# Patient Record
Sex: FEMALE | Race: WHITE | NOT HISPANIC OR LATINO
[De-identification: names, ages, dates, MRNs, and addresses within clinical notes are randomized per-mention and may not be internally consistent; named-entity substitution may affect disease eponyms.]

---

## 2021-09-27 PROBLEM — Z00.00 ENCOUNTER FOR PREVENTIVE HEALTH EXAMINATION: Status: ACTIVE | Noted: 2021-09-27

## 2021-09-28 ENCOUNTER — APPOINTMENT (OUTPATIENT)
Dept: CARDIOLOGY | Facility: CLINIC | Age: 56
End: 2021-09-28
Payer: COMMERCIAL

## 2021-09-28 VITALS
RESPIRATION RATE: 14 BRPM | HEIGHT: 61 IN | WEIGHT: 137 LBS | DIASTOLIC BLOOD PRESSURE: 68 MMHG | HEART RATE: 68 BPM | SYSTOLIC BLOOD PRESSURE: 114 MMHG | BODY MASS INDEX: 25.86 KG/M2

## 2021-09-28 DIAGNOSIS — Z78.9 OTHER SPECIFIED HEALTH STATUS: ICD-10-CM

## 2021-09-28 DIAGNOSIS — Z82.49 FAMILY HISTORY OF ISCHEMIC HEART DISEASE AND OTHER DISEASES OF THE CIRCULATORY SYSTEM: ICD-10-CM

## 2021-09-28 DIAGNOSIS — R53.83 OTHER FATIGUE: ICD-10-CM

## 2021-09-28 PROCEDURE — 99244 OFF/OP CNSLTJ NEW/EST MOD 40: CPT

## 2021-09-28 PROCEDURE — 93000 ELECTROCARDIOGRAM COMPLETE: CPT

## 2021-11-02 ENCOUNTER — APPOINTMENT (OUTPATIENT)
Dept: CARDIOLOGY | Facility: CLINIC | Age: 56
End: 2021-11-02
Payer: COMMERCIAL

## 2021-11-02 PROCEDURE — 93306 TTE W/DOPPLER COMPLETE: CPT

## 2021-11-03 ENCOUNTER — APPOINTMENT (OUTPATIENT)
Dept: CARDIOLOGY | Facility: CLINIC | Age: 56
End: 2021-11-03
Payer: COMMERCIAL

## 2021-11-03 PROCEDURE — 93015 CV STRESS TEST SUPVJ I&R: CPT

## 2021-11-10 ENCOUNTER — APPOINTMENT (OUTPATIENT)
Dept: CARDIOLOGY | Facility: CLINIC | Age: 56
End: 2021-11-10
Payer: COMMERCIAL

## 2021-11-10 ENCOUNTER — NON-APPOINTMENT (OUTPATIENT)
Age: 56
End: 2021-11-10

## 2021-11-10 VITALS
RESPIRATION RATE: 14 BRPM | OXYGEN SATURATION: 98 % | WEIGHT: 139 LBS | HEIGHT: 61 IN | HEART RATE: 68 BPM | SYSTOLIC BLOOD PRESSURE: 100 MMHG | BODY MASS INDEX: 26.24 KG/M2 | DIASTOLIC BLOOD PRESSURE: 64 MMHG

## 2021-11-10 PROCEDURE — 93000 ELECTROCARDIOGRAM COMPLETE: CPT

## 2021-11-10 PROCEDURE — 99214 OFFICE O/P EST MOD 30 MIN: CPT

## 2021-11-10 NOTE — REASON FOR VISIT
[FreeTextEntry1] : The patient is a pleasant 56-year-old  female, native of [facility], with a past medical history significant for hyperlipidemia as well as GERD and chronic upper and lower back pain, who presents for follow up evaluation.

## 2021-11-10 NOTE — HISTORY OF PRESENT ILLNESS
[FreeTextEntry1] : Mrs. Casanova presents today for follow up evaluation of her mild left sided chest discomfort and intermittent palpitations as well as results of her cardiac testing.  Presently she is without further chest discomfort, denies shortness of breath, lightheadedness or syncope.  Still experiences a rare palpitation from time to time, but not as much as before.  Drinks one cup of coffee daily and tries to hydrate well.

## 2021-11-10 NOTE — PHYSICAL EXAM
[Well Developed] : well developed [No Acute Distress] : no acute distress [Well Nourished] : well nourished [Normal Conjunctiva] : normal conjunctiva [Normal Venous Pressure] : normal venous pressure [No Carotid Bruit] : no carotid bruit [Normal S1, S2] : normal S1, S2 [No Rub] : no rub [No Gallop] : no gallop [Clear Lung Fields] : clear lung fields [No Respiratory Distress] : no respiratory distress  [Soft] : abdomen soft [Normal Bowel Sounds] : normal bowel sounds [Normal Gait] : normal gait [No Edema] : no edema [No Rash] : no rash [No Skin Lesions] : no skin lesions [Moves all extremities] : moves all extremities [No Focal Deficits] : no focal deficits [Normal Speech] : normal speech [Alert and Oriented] : alert and oriented [Normal memory] : normal memory [de-identified] : I/VI systolic murmur

## 2021-11-10 NOTE — DISCUSSION/SUMMARY
[FreeTextEntry1] : 1 - Chest discomfort and intermittent palpitations:  Presently she is without further chest discomfort, denies shortness of breath, lightheadedness or syncope.  Still experiences a rare palpitation from time to time, but not as much as before.  Drinks one cup of coffee daily and tries to hydrate well.\par \par Echocardiogram:  EF of 60-65%.  There is lipomatous hypertrophy of the interatrial septum, without evidence of shunting.  Trace mitral valve regurgitation.\par \par Exercise stress test:  Negative exercise stress test for ischemia.\par \par Patient has yet undergone the 30-day ambulatory event monitor as she state she did not receive it.  We will order the monitor for her again.  Will keep caffeine intake to a minimum and will continue to hydrate well.  \par \par 2 - Hyperlipidemia:  continue with Atorvastatin 20mg daily.  Follow low fat, low cholesterol diet.  Will have repeat fasting lipid panel through PCPs office in a few weeks.  \par \par 3 - Follow up in with Dr. Man in 2 months for results of ambulatory event monitor.

## 2022-01-11 ENCOUNTER — APPOINTMENT (OUTPATIENT)
Dept: CARDIOLOGY | Facility: CLINIC | Age: 57
End: 2022-01-11

## 2022-04-12 ENCOUNTER — APPOINTMENT (OUTPATIENT)
Dept: CARDIOLOGY | Facility: CLINIC | Age: 57
End: 2022-04-12
Payer: COMMERCIAL

## 2022-04-12 ENCOUNTER — NON-APPOINTMENT (OUTPATIENT)
Age: 57
End: 2022-04-12

## 2022-04-12 VITALS
HEART RATE: 73 BPM | DIASTOLIC BLOOD PRESSURE: 76 MMHG | HEIGHT: 61 IN | RESPIRATION RATE: 14 BRPM | BODY MASS INDEX: 26.43 KG/M2 | SYSTOLIC BLOOD PRESSURE: 120 MMHG | WEIGHT: 140 LBS

## 2022-04-12 PROCEDURE — 93000 ELECTROCARDIOGRAM COMPLETE: CPT

## 2022-04-12 PROCEDURE — 99214 OFFICE O/P EST MOD 30 MIN: CPT

## 2022-04-12 NOTE — HISTORY OF PRESENT ILLNESS
[FreeTextEntry1] : Mrs. Casanova presents today without complaints of exertional chest pain, shortness of breath, lightheadedness or syncope.  She has rare palpitations.  Not experiencing them as much as before.  Feels them more when she is stressed or anxious.  Only had a few occasions while wearing the monitor that she felt the palpitations, but by the time she tried to enter it, they had subsided.  Drinks one cup of coffee daily and hydrates well.

## 2022-04-12 NOTE — PHYSICAL EXAM
[Well Nourished] : well nourished [Well Developed] : well developed [No Acute Distress] : no acute distress [Normal Conjunctiva] : normal conjunctiva [Normal Venous Pressure] : normal venous pressure [No Carotid Bruit] : no carotid bruit [Normal S1, S2] : normal S1, S2 [No Rub] : no rub [No Gallop] : no gallop [Clear Lung Fields] : clear lung fields [No Respiratory Distress] : no respiratory distress  [Soft] : abdomen soft [Normal Bowel Sounds] : normal bowel sounds [Normal Gait] : normal gait [No Edema] : no edema [No Rash] : no rash [No Skin Lesions] : no skin lesions [Moves all extremities] : moves all extremities [No Focal Deficits] : no focal deficits [Normal Speech] : normal speech [Alert and Oriented] : alert and oriented [Normal memory] : normal memory [de-identified] : I/VI systolic murmur

## 2022-04-12 NOTE — REASON FOR VISIT
[FreeTextEntry1] : Mrs. Casanova is a pleasant 56-year-old  female, native of [facility], with a past medical history significant for hyperlipidemia as well as GERD and chronic upper and lower back pain, who presents for follow up evaluation.

## 2022-04-12 NOTE — DISCUSSION/SUMMARY
[FreeTextEntry1] : 1 - Palpitations:  She has rare palpitations.  Not experiencing them as much as before.  Feels them more when she is stressed or anxious.  Only had a few occasions while wearing the monitor that she felt the palpitations, but by the time she tried to enter it, they had subsided.\par \par 30-day ambulatory event monitor:  no atrial fibrillation, SVT, pauses, heart block, VT.  Sinus rhythm with average heart rate of 75 bpm (max 120, min 50).  PVC burden 1%.  PAC burden <1%.\par \par Advised to keep caffeine intake to a minimum, hydrate well and get adequate sleep.\par \par 2 - Hyperlipidemia:  having blood work done at PCP tomorrow.  Will have results forwarded to our office.  Continue Atorvastatin 20mg daily.  Follow low fat, low cholesterol diet.  Has notice occasional joint discomfort, does not happen all of the time.  Will keep an eye on this and will let us know if it becomes more frequent and bothersome.\par \par 3 - Follow up with Dr. Man in 6 months.

## 2022-12-07 ENCOUNTER — APPOINTMENT (OUTPATIENT)
Dept: CARDIOLOGY | Facility: CLINIC | Age: 57
End: 2022-12-07

## 2022-12-07 VITALS
WEIGHT: 147 LBS | HEIGHT: 61 IN | RESPIRATION RATE: 14 BRPM | BODY MASS INDEX: 27.75 KG/M2 | HEART RATE: 72 BPM | DIASTOLIC BLOOD PRESSURE: 76 MMHG | SYSTOLIC BLOOD PRESSURE: 110 MMHG

## 2022-12-07 PROCEDURE — 93000 ELECTROCARDIOGRAM COMPLETE: CPT

## 2022-12-07 PROCEDURE — 99213 OFFICE O/P EST LOW 20 MIN: CPT | Mod: 25

## 2022-12-09 NOTE — REASON FOR VISIT
[FreeTextEntry1] : Mrs. Casanova is a pleasant 57-year-old  female, native of [facility], with a past medical history significant for hyperlipidemia, GERD, as well as chronic upper and lower back pain who presents for a follow up evaluation. \par

## 2022-12-09 NOTE — PHYSICAL EXAM
[Well Developed] : well developed [Well Nourished] : well nourished [No Acute Distress] : no acute distress [Normal Conjunctiva] : normal conjunctiva [Normal Venous Pressure] : normal venous pressure [No Carotid Bruit] : no carotid bruit [Normal S1, S2] : normal S1, S2 [No Rub] : no rub [No Gallop] : no gallop [Clear Lung Fields] : clear lung fields [No Respiratory Distress] : no respiratory distress  [Soft] : abdomen soft [Normal Bowel Sounds] : normal bowel sounds [Normal Gait] : normal gait [No Edema] : no edema [No Rash] : no rash [No Skin Lesions] : no skin lesions [Moves all extremities] : moves all extremities [No Focal Deficits] : no focal deficits [Normal Speech] : normal speech [Alert and Oriented] : alert and oriented [Normal memory] : normal memory [de-identified] : I/VI systolic murmur

## 2022-12-09 NOTE — ASSESSMENT
[FreeTextEntry1] : 1.  EKG today reveals normal sinus rhythm at 72 bpm.  Normal intervals.  No evidence of ischemia. \par \par 2.  Palpitations:  Patient presents today without chest pain, shortness of breath, or further palpitations.  She has undergone a non-invasive cardiac evaluation in the last 12 months which included echocardiography, exercise stress testing, and a 30-day ambulatory event monitor, all demonstrating unremarkable findings.  \par \par 3.  Given the above, I have advised Mrs. Casanova to follow a strict low-fat / low-cholesterol and low-caffeine diet.  She should exercise on a regular basis and if clinically stable, follow up here in approximately 12 months.  \par

## 2023-05-31 ENCOUNTER — NON-APPOINTMENT (OUTPATIENT)
Age: 58
End: 2023-05-31

## 2023-05-31 ENCOUNTER — APPOINTMENT (OUTPATIENT)
Dept: CARDIOLOGY | Facility: CLINIC | Age: 58
End: 2023-05-31
Payer: COMMERCIAL

## 2023-05-31 VITALS
DIASTOLIC BLOOD PRESSURE: 70 MMHG | WEIGHT: 149 LBS | OXYGEN SATURATION: 98 % | BODY MASS INDEX: 28.13 KG/M2 | HEIGHT: 61 IN | RESPIRATION RATE: 14 BRPM | HEART RATE: 80 BPM | SYSTOLIC BLOOD PRESSURE: 100 MMHG

## 2023-05-31 DIAGNOSIS — K21.9 GASTRO-ESOPHAGEAL REFLUX DISEASE W/OUT ESOPHAGITIS: ICD-10-CM

## 2023-05-31 PROCEDURE — 99214 OFFICE O/P EST MOD 30 MIN: CPT | Mod: 25

## 2023-05-31 PROCEDURE — 93000 ELECTROCARDIOGRAM COMPLETE: CPT

## 2023-05-31 NOTE — REASON FOR VISIT
[FreeTextEntry1] : Mrs. Casanova is a pleasant 57-year-old  female, native of [facility], with a past medical history significant for hyperlipidemia, GERD, as well as chronic upper and lower back pain who presents for a follow up evaluation. \par

## 2023-05-31 NOTE — PHYSICAL EXAM
[Well Developed] : well developed [Well Nourished] : well nourished [No Acute Distress] : no acute distress [Normal Conjunctiva] : normal conjunctiva [Normal Venous Pressure] : normal venous pressure [No Carotid Bruit] : no carotid bruit [Normal S1, S2] : normal S1, S2 [No Rub] : no rub [No Gallop] : no gallop [Clear Lung Fields] : clear lung fields [No Respiratory Distress] : no respiratory distress  [Soft] : abdomen soft [Normal Bowel Sounds] : normal bowel sounds [Normal Gait] : normal gait [No Edema] : no edema [No Rash] : no rash [No Skin Lesions] : no skin lesions [Moves all extremities] : moves all extremities [No Focal Deficits] : no focal deficits [Normal Speech] : normal speech [Alert and Oriented] : alert and oriented [Normal memory] : normal memory [de-identified] : Overweight [de-identified] : I/VI systolic murmur

## 2023-05-31 NOTE — DISCUSSION/SUMMARY
[EKG obtained to assist in diagnosis and management of assessed problem(s)] : EKG obtained to assist in diagnosis and management of assessed problem(s) [FreeTextEntry1] : 1 - Palpitations:  resents today with complaints of intermittent palpitations with occasional associated lightheadedness. Can last a few seconds at a time.  Denies exertional chest pain, shortness of breath or syncope.  Will have Mrs. Casanova undergo a 30-day ambulatory event monitor to assess for any arrhythmias.  Advised to keep her caffeine intake to a minimum, hydrate well, and get adequate sleep.\par \par 2 - Hyperlipidemia:  Had blood work a couple of months ago at her PCP office.  Will call to have results forwarded to our office for review.  Continue with Atorvastatin 20mg daily.  Follow low fat, low cholesterol diet.\par \par 3 -  GERD:  She has noted mid-epigastric and left lateral chest discomfort, mostly after eating certain foods or when laying in bed at night.  She takes omeprazole 20mg from time to time and notices a significant improvement in her discomfort.  Patient with negative cardiac workup including echocardiogram and exercise stress test in November of 2021.  Advised to increase omeprazole to 40mg daily and take in the morning before meals.  To follow up with GI for further evaluation.\par \par 4 - Follow up in 6-8 weeks.

## 2023-05-31 NOTE — HISTORY OF PRESENT ILLNESS
[FreeTextEntry1] : Mrs. Casanova presents today with complaints of intermittent palpitations with occasional associated lightheadedness. Can last a few seconds at a time.  Denies exertional chest pain, shortness of breath or syncope.  She has noted mid-epigastric and left lateral chest discomfort, mostly after eating certain foods or when laying in bed at night.  She takes omeprazole 20mg from time to time and notices a significant improvement in her discomfort.

## 2023-08-01 ENCOUNTER — APPOINTMENT (OUTPATIENT)
Dept: CARDIOLOGY | Facility: CLINIC | Age: 58
End: 2023-08-01

## 2023-09-21 ENCOUNTER — TRANSCRIPTION ENCOUNTER (OUTPATIENT)
Age: 58
End: 2023-09-21

## 2024-03-06 ENCOUNTER — APPOINTMENT (OUTPATIENT)
Dept: CARDIOLOGY | Facility: CLINIC | Age: 59
End: 2024-03-06
Payer: COMMERCIAL

## 2024-03-06 VITALS
HEART RATE: 71 BPM | HEIGHT: 61 IN | SYSTOLIC BLOOD PRESSURE: 110 MMHG | WEIGHT: 150 LBS | DIASTOLIC BLOOD PRESSURE: 80 MMHG | RESPIRATION RATE: 14 BRPM | BODY MASS INDEX: 28.32 KG/M2

## 2024-03-06 DIAGNOSIS — R07.89 OTHER CHEST PAIN: ICD-10-CM

## 2024-03-06 PROCEDURE — G2211 COMPLEX E/M VISIT ADD ON: CPT

## 2024-03-06 PROCEDURE — 99214 OFFICE O/P EST MOD 30 MIN: CPT

## 2024-03-06 PROCEDURE — 93000 ELECTROCARDIOGRAM COMPLETE: CPT

## 2024-03-07 NOTE — HISTORY OF PRESENT ILLNESS
[FreeTextEntry1] : Mrs. Casanova states that over the past several weeks, she has experienced intermittent episodes of left-sided chest pain described as a pressure-like sensation. Chest pain occurred with and without exertion. She also complains of palpitations and did not undergo 30-day ambulatory event monitoring last year because of issues with the patches.

## 2024-03-07 NOTE — ASSESSMENT
[FreeTextEntry1] : 1. EKG today reveals normal sinus rhythm at 71 bpm. Normal intervals. No evidence of ischemia.   2. Hyperlipidemia: Review of recent bloodwork reveals a total cholesterol of 173, HDL 76, LDL 94, triglycerides 75. Patient advised to continue current statin therapy and follow a strict low-fat / low-cholesterol diet.   3. Chest pain/palpitations: Patient with recent onset chest pain with both typical and atypical features. Recently underwent an abdominal CAT scan and was noted to have significant vascular calcification. I am recommending that she undergo cardiac CTA for further evaluation.   4. Palpitations: Patient with ongoing palpitations. Will attempt to obtain a 30-day ambulatory event monitor with hypoallergenic patches in order to further assess these complaints. If clinically stable, office visit six weeks.

## 2024-03-07 NOTE — REASON FOR VISIT
[FreeTextEntry1] : Mrs. Casanova is a pleasant 58-year-old  female, native of [facility], with a past medical history significant for hyperlipidemia, GERD, and palpitations, who presents for a follow up

## 2024-03-07 NOTE — PHYSICAL EXAM
[Well Nourished] : well nourished [Well Developed] : well developed [No Acute Distress] : no acute distress [Normal Conjunctiva] : normal conjunctiva [Normal Venous Pressure] : normal venous pressure [No Carotid Bruit] : no carotid bruit [Normal S1, S2] : normal S1, S2 [No Gallop] : no gallop [No Rub] : no rub [No Respiratory Distress] : no respiratory distress  [Clear Lung Fields] : clear lung fields [Soft] : abdomen soft [Normal Bowel Sounds] : normal bowel sounds [No Edema] : no edema [No Rash] : no rash [Normal Gait] : normal gait [No Skin Lesions] : no skin lesions [Moves all extremities] : moves all extremities [Normal Speech] : normal speech [No Focal Deficits] : no focal deficits [Alert and Oriented] : alert and oriented [Normal memory] : normal memory [de-identified] : Overweight [de-identified] : I/VI systolic murmur

## 2024-04-19 ENCOUNTER — APPOINTMENT (OUTPATIENT)
Dept: CT IMAGING | Facility: CLINIC | Age: 59
End: 2024-04-19
Payer: COMMERCIAL

## 2024-04-19 ENCOUNTER — OUTPATIENT (OUTPATIENT)
Dept: OUTPATIENT SERVICES | Facility: HOSPITAL | Age: 59
LOS: 1 days | End: 2024-04-19
Payer: COMMERCIAL

## 2024-04-19 DIAGNOSIS — R07.89 OTHER CHEST PAIN: ICD-10-CM

## 2024-04-19 PROCEDURE — 75574 CT ANGIO HRT W/3D IMAGE: CPT | Mod: 26

## 2024-04-19 PROCEDURE — 75574 CT ANGIO HRT W/3D IMAGE: CPT

## 2024-04-24 ENCOUNTER — APPOINTMENT (OUTPATIENT)
Dept: CARDIOLOGY | Facility: CLINIC | Age: 59
End: 2024-04-24
Payer: COMMERCIAL

## 2024-04-24 ENCOUNTER — NON-APPOINTMENT (OUTPATIENT)
Age: 59
End: 2024-04-24

## 2024-04-24 VITALS
DIASTOLIC BLOOD PRESSURE: 76 MMHG | HEIGHT: 61 IN | SYSTOLIC BLOOD PRESSURE: 104 MMHG | RESPIRATION RATE: 14 BRPM | HEART RATE: 69 BPM | WEIGHT: 150 LBS | BODY MASS INDEX: 28.32 KG/M2

## 2024-04-24 DIAGNOSIS — I47.10 SUPRAVENTRICULAR TACHYCARDIA, UNSPECIFIED: ICD-10-CM

## 2024-04-24 DIAGNOSIS — R00.2 PALPITATIONS: ICD-10-CM

## 2024-04-24 DIAGNOSIS — E78.00 PURE HYPERCHOLESTEROLEMIA, UNSPECIFIED: ICD-10-CM

## 2024-04-24 PROCEDURE — 99214 OFFICE O/P EST MOD 30 MIN: CPT

## 2024-04-24 PROCEDURE — 93000 ELECTROCARDIOGRAM COMPLETE: CPT

## 2024-04-24 RX ORDER — ATORVASTATIN CALCIUM 20 MG/1
20 TABLET, FILM COATED ORAL
Qty: 90 | Refills: 3 | Status: ACTIVE | COMMUNITY
Start: 1900-01-01 | End: 1900-01-01

## 2024-04-24 RX ORDER — OMEPRAZOLE 40 MG/1
40 CAPSULE, DELAYED RELEASE ORAL
Qty: 90 | Refills: 1 | Status: DISCONTINUED | COMMUNITY
End: 2024-04-24

## 2024-07-31 ENCOUNTER — APPOINTMENT (OUTPATIENT)
Dept: CARDIOLOGY | Facility: CLINIC | Age: 59
End: 2024-07-31

## 2024-09-06 ENCOUNTER — APPOINTMENT (OUTPATIENT)
Dept: CARDIOLOGY | Facility: CLINIC | Age: 59
End: 2024-09-06